# Patient Record
Sex: FEMALE | Race: WHITE | NOT HISPANIC OR LATINO | URBAN - METROPOLITAN AREA
[De-identification: names, ages, dates, MRNs, and addresses within clinical notes are randomized per-mention and may not be internally consistent; named-entity substitution may affect disease eponyms.]

---

## 2024-08-01 ENCOUNTER — TELEPHONE (OUTPATIENT)
Dept: NEUROSURGERY | Facility: CLINIC | Age: 79
End: 2024-08-01
Payer: MEDICARE

## 2024-08-01 NOTE — TELEPHONE ENCOUNTER
Patient called back to inform office that she had a MRI done at [facility] in July of 2024 and she has MRI disc with her.

## 2024-08-15 NOTE — PROGRESS NOTES
Jamee Pickering is a 79 y.o.  female p/w back, knee, and leg pain. Referred by Dr. Carin Broderick.    14/14 systems reviewed and negative other than what is listed in the history of present illness     Past Medical History:   Diagnosis Date    Personal history of other diseases of the circulatory system     History of hypertension     Past Surgical History:   Procedure Laterality Date    APPENDECTOMY  07/28/2016    Appendectomy    COLECTOMY  07/28/2016    Partial Colectomy    EYE SURGERY  07/28/2016    Eye Surgery    FOOT SURGERY  07/28/2016    Foot Surgery    HERNIA REPAIR  07/28/2016    Hernia Repair    HYSTERECTOMY  07/28/2016    Hysterectomy    KNEE SURGERY  07/28/2016    Knee Surgery    TONSILLECTOMY  07/28/2016    Tonsillectomy     No current outpatient medications on file.    There were no vitals filed for this visit.  Objective   General: Well developed, awake/alert/oriented x3, no distress, alert and cooperative  Skin: Warm and dry, no lesions, no rashes  ENMT: Mucous membranes moist, no apparent injury, no lesions seen  Head/Neck: Neck Supple, no apparent injury  Respiratory/Thorax: Normal breath sounds with good chest expansion, thorax symmetric  Cardiovascular: No pitting edema, no JVD    Motor Strength: 5/5 Throughout all extremities    Muscle Bulk: Normal and symmetric in all extremities    Posture:   -- Cervical: Normal  -- Thoracic: Normal  -- Lumbar : Normal  Paraspinal muscle spasm/tenderness absent.     Sensation: intact to light touch     Relevant Results    upright xrays: good alignment  MRI T-spine: negative for stenosisi   MRI L-spine: L3-4 mild canal stenosis       Problem List Items Addressed This Visit    None    Assessment/Plan       Jamee Pickering is a 79 y.o.  female p/w back, knee, and leg pain. Referred by Dr. Carin Broderick.  She has improving lower back pain that radiates to her left thigh. This is a 3/10 pain compared to the 20/10 pain it started as. She has severe burning sensation on lateral  "thigh. Additionally reports hip and \"tailbone\" pain. 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 She denies weakness/ numbness/ perianal anesthesia/bowel and bladder incontinence.    I reviewed the imaging with the patient.  We discussed that the MRI showed some stenosis at L3-4. On examination she is moving all four limbs with full strength and has intact neurological examination.    Surgery is not recommended at this point.  Her pain has been improving, offered physical and massage therapy.  Referred to [facility] for physical and massage therapy.  Follow up as needed.      Paresh Monroy MD    of Neurosurgery   [facility]   [facility]   Office: [phone]  Fax: [phone]     Scribe Attestation  By signing my name below, I, Sedrick Butler,   attest that this documentation has been prepared under the direction and in the presence of Paresh Monroy MD.   "

## 2024-08-19 ENCOUNTER — APPOINTMENT (OUTPATIENT)
Dept: NEUROSURGERY | Facility: CLINIC | Age: 79
End: 2024-08-19
Payer: MEDICARE

## 2024-08-19 VITALS
DIASTOLIC BLOOD PRESSURE: 96 MMHG | HEIGHT: 63 IN | HEART RATE: 77 BPM | BODY MASS INDEX: 35.65 KG/M2 | SYSTOLIC BLOOD PRESSURE: 180 MMHG | WEIGHT: 201.2 LBS

## 2024-08-19 DIAGNOSIS — M54.16 LUMBAR RADICULOPATHY: Primary | ICD-10-CM

## 2024-08-19 PROCEDURE — 1125F AMNT PAIN NOTED PAIN PRSNT: CPT | Performed by: STUDENT IN AN ORGANIZED HEALTH CARE EDUCATION/TRAINING PROGRAM

## 2024-08-19 PROCEDURE — 1036F TOBACCO NON-USER: CPT | Performed by: STUDENT IN AN ORGANIZED HEALTH CARE EDUCATION/TRAINING PROGRAM

## 2024-08-19 PROCEDURE — 99205 OFFICE O/P NEW HI 60 MIN: CPT | Performed by: STUDENT IN AN ORGANIZED HEALTH CARE EDUCATION/TRAINING PROGRAM

## 2024-08-19 PROCEDURE — 1159F MED LIST DOCD IN RCRD: CPT | Performed by: STUDENT IN AN ORGANIZED HEALTH CARE EDUCATION/TRAINING PROGRAM

## 2024-08-19 ASSESSMENT — PATIENT HEALTH QUESTIONNAIRE - PHQ9
2. FEELING DOWN, DEPRESSED OR HOPELESS: NOT AT ALL
1. LITTLE INTEREST OR PLEASURE IN DOING THINGS: NOT AT ALL
SUM OF ALL RESPONSES TO PHQ9 QUESTIONS 1 AND 2: 0

## 2024-08-19 ASSESSMENT — PAIN SCALES - GENERAL: PAINLEVEL: 4
